# Patient Record
Sex: MALE | NOT HISPANIC OR LATINO | Employment: FULL TIME | URBAN - METROPOLITAN AREA
[De-identification: names, ages, dates, MRNs, and addresses within clinical notes are randomized per-mention and may not be internally consistent; named-entity substitution may affect disease eponyms.]

---

## 2023-11-22 ENCOUNTER — APPOINTMENT (OUTPATIENT)
Dept: PRIMARY CARE | Facility: CLINIC | Age: 43
End: 2023-11-22
Payer: COMMERCIAL

## 2023-12-14 ENCOUNTER — APPOINTMENT (OUTPATIENT)
Dept: RADIOLOGY | Facility: HOSPITAL | Age: 43
End: 2023-12-14
Payer: COMMERCIAL

## 2023-12-14 ENCOUNTER — HOSPITAL ENCOUNTER (EMERGENCY)
Facility: HOSPITAL | Age: 43
Discharge: HOME | End: 2023-12-14
Payer: COMMERCIAL

## 2023-12-14 VITALS
HEIGHT: 71 IN | OXYGEN SATURATION: 99 % | SYSTOLIC BLOOD PRESSURE: 127 MMHG | DIASTOLIC BLOOD PRESSURE: 88 MMHG | WEIGHT: 220 LBS | RESPIRATION RATE: 18 BRPM | HEART RATE: 88 BPM | BODY MASS INDEX: 30.8 KG/M2 | TEMPERATURE: 97.2 F

## 2023-12-14 DIAGNOSIS — S86.812A STRAIN OF CALF MUSCLE, LEFT, INITIAL ENCOUNTER: ICD-10-CM

## 2023-12-14 DIAGNOSIS — S86.112A GASTROCNEMIUS TEAR, LEFT, INITIAL ENCOUNTER: Primary | ICD-10-CM

## 2023-12-14 PROBLEM — S86.819A STRAIN OF CALF MUSCLE: Status: ACTIVE | Noted: 2023-12-14

## 2023-12-14 PROCEDURE — 73590 X-RAY EXAM OF LOWER LEG: CPT | Mod: LT

## 2023-12-14 PROCEDURE — 93971 EXTREMITY STUDY: CPT

## 2023-12-14 PROCEDURE — 99283 EMERGENCY DEPT VISIT LOW MDM: CPT

## 2023-12-14 PROCEDURE — 99284 EMERGENCY DEPT VISIT MOD MDM: CPT | Mod: 25

## 2023-12-14 RX ORDER — ACETAMINOPHEN 325 MG/1
650 TABLET ORAL ONCE
Status: COMPLETED | OUTPATIENT
Start: 2023-12-14 | End: 2023-12-14

## 2023-12-14 RX ADMIN — ACETAMINOPHEN 650 MG: 325 TABLET ORAL at 18:59

## 2023-12-14 ASSESSMENT — PAIN - FUNCTIONAL ASSESSMENT
PAIN_FUNCTIONAL_ASSESSMENT: 0-10

## 2023-12-14 ASSESSMENT — LIFESTYLE VARIABLES
REASON UNABLE TO ASSESS: NO
EVER HAD A DRINK FIRST THING IN THE MORNING TO STEADY YOUR NERVES TO GET RID OF A HANGOVER: NO
HAVE PEOPLE ANNOYED YOU BY CRITICIZING YOUR DRINKING: NO
EVER FELT BAD OR GUILTY ABOUT YOUR DRINKING: NO
HAVE YOU EVER FELT YOU SHOULD CUT DOWN ON YOUR DRINKING: NO

## 2023-12-14 ASSESSMENT — PAIN DESCRIPTION - ORIENTATION: ORIENTATION: LEFT

## 2023-12-14 ASSESSMENT — COLUMBIA-SUICIDE SEVERITY RATING SCALE - C-SSRS
1. IN THE PAST MONTH, HAVE YOU WISHED YOU WERE DEAD OR WISHED YOU COULD GO TO SLEEP AND NOT WAKE UP?: NO
2. HAVE YOU ACTUALLY HAD ANY THOUGHTS OF KILLING YOURSELF?: NO
6. HAVE YOU EVER DONE ANYTHING, STARTED TO DO ANYTHING, OR PREPARED TO DO ANYTHING TO END YOUR LIFE?: NO

## 2023-12-14 ASSESSMENT — PAIN SCALES - GENERAL
PAINLEVEL_OUTOF10: 6
PAINLEVEL_OUTOF10: 3
PAINLEVEL_OUTOF10: 7

## 2023-12-14 ASSESSMENT — PAIN DESCRIPTION - LOCATION
LOCATION: LEG
LOCATION: LEG

## 2023-12-14 ASSESSMENT — PAIN DESCRIPTION - PAIN TYPE: TYPE: ACUTE PAIN

## 2023-12-14 NOTE — Clinical Note
Manolo Laughlin was seen and treated in our emergency department on 12/14/2023.  He may return to work on 12/17/2023.  1-3 days if needed . Use crutches as needed . Until follow up with orthopedics      If you have any questions or concerns, please don't hesitate to call.      La Whelan, APRN-CNP

## 2023-12-14 NOTE — ED PROVIDER NOTES
Department of Emergency Medicine   ED  Provider Note  Admit Date/RoomTime: 12/14/2023  6:35 PM  ED Room: ST24/ST24        History of Present Illness:  Chief Complaint   Patient presents with    Leg Pain     Pt states he was walking and felt a pop in left calf. C/o left calf pain increasing with walking.          Manolo Laughlin is a 43 y.o. male denies chronic medical illnesses presenting to the ED for left calf pain, beginning suddenly around 4 PM.  Patient states that he forgot something and went to turn around and run back to his car when he felt a pop in his calf and had sudden onset of pain now is having difficulty walking.  He is unaware of any bruising or swelling no change in temperature color sensation of the leg.  Reports it hurts to move his foot up and down in the calf area.  History of a sprained ankle in the past while running cross-country and school several years ago.  No fracture no other injury no history of blood clots.  He denies chest pain or shortness of breath no abdominal pain nausea vomiting no fever or chills.    Review of Systems:   Pertinent positives and negatives are stated within HPI, all other systems reviewed and are negative.        --------------------------------------------- PAST HISTORY ---------------------------------------------  Past Medical History:  has no past medical history on file.  Past Surgical History:  has a past surgical history that includes Other surgical history (06/26/2020); Other surgical history (06/26/2020); and Other surgical history (06/26/2020).  Social History:    Family History: family history is not on file.. Unless otherwise noted, family history is non contributory  The patient’s home medications have been reviewed.  Allergies: Penicillins and Yeast, dried        ---------------------------------------------------PHYSICAL EXAM--------------------------------------    GENERAL APPEARANCE: Awake and alert.   VITAL SIGNS: As per the nurses' triage  "record.   HEENT: Normocephalic, atraumatic. Extraocular muscles are intact.   CHEST: Nontender to palpation. Clear to auscultation bilaterally. No rales, rhonchi, or wheezing.   HEART: S1, S2. Regular rate and rhythm. No murmurs, gallops or rubs.  Strong and equal pulses in the extremities.   ABDOMEN: Soft, nontender, nondistended, positive bowel sounds, no palpable masses.  MUSCULCSKELETAL: Moves upper extremities with no difficulty.  Left lower extremity quadricep intact.  No pain palpation manipulation of the knee.  No bruising or edema noted.  Tenderness to the mid calf no redness or warmth no bruising negative Agarwal test.  No pain palpation manipulation of the ankle or foot.  Pedal pulse intact neurovascularly intact.  Has pain at the mid calf with dorsiflexion of the foot.  Right lower extremity patient was with no difficulty.  NEUROLOGICAL: Awake, alert and oriented x 3. Power intact in the upper and lower extremities. Sensation is intact to light touch in the upper and lower extremities.   IMMUNOLOGICAL: No lymphatic streaking noted   DERM: No petechiae, rashes, or ecchymoses.          ------------------------- NURSING NOTES AND VITALS REVIEWED ---------------------------  The nursing notes within the ED encounter and vital signs as below have been reviewed by myself  /90 (Patient Position: Sitting)   Pulse 96   Temp 36.2 °C (97.2 °F) (Temporal)   Resp 20   Ht 1.803 m (5' 11\")   Wt 99.8 kg (220 lb)   SpO2 100%   BMI 30.68 kg/m²     Oxygen Saturation Interpretation: Normal      The patient’s available past medical records and past encounters were reviewed.          -----------------------DIAGNOSTIC RESULTS------------------------  LABS:    Labs Reviewed - No data to display    As interpreted by me, the above displayed labs are abnormal. All other labs obtained during this visit were within normal range or not returned as of this dictation.      Lower extremity venous duplex left   Final Result "   No evidence of deep vein thrombosis.        Signed by: Wilian Wooten 12/14/2023 8:39 PM   Dictation workstation:   [id number]      XR tibia fibula left 2 views   Final Result   No acute osseous abnormality of the tibia or fibula with surrounding   subcutaneous edema without fluid collection suggested.        Signed by: Wilian Wooten 12/14/2023 7:19 PM   Dictation workstation:   [id number]              Lower extremity venous duplex left   Final Result   No evidence of deep vein thrombosis.        Signed by: Wilian Wooten 12/14/2023 8:39 PM   Dictation workstation:   [id number]      XR tibia fibula left 2 views   Final Result   No acute osseous abnormality of the tibia or fibula with surrounding   subcutaneous edema without fluid collection suggested.        Signed by: Wilian Wooten 12/14/2023 7:19 PM   Dictation workstation:   [id number]              ------------------------------ ED COURSE/MEDICAL DECISION MAKING----------------------  Medical Decision Making:   Exam: A medically appropriate exam performed, outlined above, given the known history and presentation.    History obtained from: Review of medical record patient nursing notes      Social Determinants of Health considered during this visit: Lives at home      PAST MEDICAL HISTORY/Chronic Conditions Affecting Care     has no past medical history on file.       CC/HPI Summary, Social Determinants of health, Records Reviewed, DDx, testing done/not done, ED Course, Reassessment, disposition considerations/shared decision making with patient, consults, disposition:   Presents with left calf pain after feeling a pop  Ultrasound left lower extremity-No evidence of deep vein thrombosis.   X-ray tib-fib left- No acute osseous abnormality of the tibia or fibula with surrounding  subcutaneous edema without fluid collection suggested.  Tylenol    Medical Decision Making/Differential Diagnosis:  Differentials include not limited to gastroc tear versus sprain strain  versus DVT versus fracture patient is neurovascularly intact.  No signs of compartment syndrome.  No erythema to indicate cellulitis or septic joint.  Full range of motion of the knee and ankle without signs of injury  Patient is neurovascular intact.  Peripheral pulses intact.  No redness or warmth noted to the calf.  Ultrasound of the lower extremity showed no evidence of deep vein thrombosis.  X-ray of the left tib-fib showed no acute osseous abnormality of the tibia or fibula was noted to have surrounding subcutaneous edema without fluid collection suggested.  Patient was medicated with Tylenol.  Based on clinical presentation history and symptoms consistent with a left gastroc strain possible gastroc tear.  Discussed case with orthopedics on-call.  Ice rest elevate weightbearing as tolerated Ace wrap applied by nursing.  Patient was educated on crutch walking by nursing.  Remains neurovascularly intact.  Patient seen independently Dr. lewis available for consult if needed    PROCEDURES  Unless otherwise noted below, none      CONSULTS:   None  Dr. Hernandez orthopedics     ED Course as of 12/14/23 2104   Thu Dec 14, 2023   2002 Spoke with Dr. Hernandez on-call orthopedic surgeon.  Discussed case with him.  Discussed plan if ultrasound and x-ray negative weightbearing as tolerated Ace wrap ice rest and elevate and follow-up with orthopedics.  No further instructions. [TB]      ED Course User Index  [TB] aL Whelan, APRN-CNP         Diagnoses as of 12/14/23 2104   Gastrocnemius tear, left, initial encounter   Strain of calf muscle, left, initial encounter         This patient has remained hemodynamically stable during their ED course.      Critical Care: None      Counseling:  The emergency provider has spoken with the patient and discussed today’s results, in addition to providing specific details for the plan of care and counseling regarding the diagnosis and prognosis.  Questions are answered at this time  and they are agreeable with the plan.         --------------------------------- IMPRESSION AND DISPOSITION ---------------------------------    IMPRESSION  1. Gastrocnemius tear, left, initial encounter    2. Strain of calf muscle, left, initial encounter        DISPOSITION  Disposition: Discharged home   Patient condition is stable        NOTE: This report was transcribed using voice recognition software. Every effort was made to ensure accuracy; however, inadvertent computerized transcription errors may be present      La Whelan, JESS-CNP  12/14/23 [id number]

## 2023-12-15 NOTE — DISCHARGE INSTRUCTIONS
Ice 20 minutes at a time 4 times a day and after activity  Tylenol Motrin for pain do not go over the recommended daily dosage.  Weightbearing as tolerated Ace wrap with ambulation    Follow-up with orthopedic surgeon within 1 week  Follow-up with primary care physician in 2 days for reevaluation  Return to the emergency department any worsening symptoms or concerns  Today it was noted that your blood pressure was elevated follow-up with primary care physician for reevaluation.  Check your blood pressure daily and document the results for your primary care physician.  Return to the emerged part with any worsening symptoms or concerns

## 2024-04-15 ENCOUNTER — OFFICE VISIT (OUTPATIENT)
Dept: PRIMARY CARE | Facility: CLINIC | Age: 44
End: 2024-04-15
Payer: COMMERCIAL

## 2024-04-15 VITALS
HEART RATE: 80 BPM | OXYGEN SATURATION: 98 % | BODY MASS INDEX: 33.5 KG/M2 | DIASTOLIC BLOOD PRESSURE: 94 MMHG | WEIGHT: 240.2 LBS | SYSTOLIC BLOOD PRESSURE: 170 MMHG

## 2024-04-15 DIAGNOSIS — R03.0 ELEVATED BLOOD PRESSURE READING: Primary | ICD-10-CM

## 2024-04-15 PROCEDURE — 99213 OFFICE O/P EST LOW 20 MIN: CPT | Performed by: PHYSICIAN ASSISTANT

## 2024-04-15 PROCEDURE — 1036F TOBACCO NON-USER: CPT | Performed by: PHYSICIAN ASSISTANT

## 2024-04-15 RX ORDER — L.ACIDOPH/B.ANIMALIS/B.LONGUM 15B CELL
1 CAPSULE ORAL DAILY
COMMUNITY
End: 2024-04-15 | Stop reason: WASHOUT

## 2024-04-15 RX ORDER — GABAPENTIN 100 MG/1
100 CAPSULE ORAL 2 TIMES DAILY
COMMUNITY
Start: 2020-06-26 | End: 2024-04-15 | Stop reason: WASHOUT

## 2024-04-15 RX ORDER — CYCLOBENZAPRINE HCL 5 MG
5 TABLET ORAL EVERY 8 HOURS PRN
COMMUNITY
Start: 2022-12-28 | End: 2024-04-15 | Stop reason: WASHOUT

## 2024-04-15 RX ORDER — FAMOTIDINE 10 MG/1
TABLET ORAL
COMMUNITY

## 2024-04-15 RX ORDER — PSYLLIUM HUSK 3.4 G/5.8G
1 POWDER ORAL DAILY
COMMUNITY
End: 2024-04-15 | Stop reason: WASHOUT

## 2024-04-15 ASSESSMENT — PATIENT HEALTH QUESTIONNAIRE - PHQ9
SUM OF ALL RESPONSES TO PHQ9 QUESTIONS 1 AND 2: 0
2. FEELING DOWN, DEPRESSED OR HOPELESS: NOT AT ALL
1. LITTLE INTEREST OR PLEASURE IN DOING THINGS: NOT AT ALL

## 2024-04-15 ASSESSMENT — PAIN SCALES - GENERAL: PAINLEVEL: 6

## 2024-04-15 ASSESSMENT — ENCOUNTER SYMPTOMS
MYALGIAS: 0
DIZZINESS: 0
VOMITING: 0
COUGH: 0
FEVER: 0
NAUSEA: 0
FATIGUE: 0
SHORTNESS OF BREATH: 0

## 2024-04-15 ASSESSMENT — COLUMBIA-SUICIDE SEVERITY RATING SCALE - C-SSRS
6. HAVE YOU EVER DONE ANYTHING, STARTED TO DO ANYTHING, OR PREPARED TO DO ANYTHING TO END YOUR LIFE?: NO
2. HAVE YOU ACTUALLY HAD ANY THOUGHTS OF KILLING YOURSELF?: NO
1. IN THE PAST MONTH, HAVE YOU WISHED YOU WERE DEAD OR WISHED YOU COULD GO TO SLEEP AND NOT WAKE UP?: NO

## 2024-04-15 NOTE — ASSESSMENT & PLAN NOTE
Pt encouraged to purchase home BP cuff and monitor readings BID x 1 week. Call w/results and will determine need for medication. Discussed lifestyle changes - exercise, low salt diet, cutting back on vaping.

## 2024-04-15 NOTE — LETTER
April 15, 2024     Patient: Manolo Laughlin   YOB: 1980   Date of Visit: 4/15/2024       To Whom It May Concern:    Manolo Laughlin was seen in my clinic on 4/15/2024 at 11:10 am. Please excuse Manolo for his absence from work on this day to make the appointment.    If you have any questions or concerns, please don't hesitate to call.         Sincerely,         Eusebia Tello PA-C

## 2024-04-15 NOTE — PROGRESS NOTES
Subjective   Patient ID: Manolo Laughlin is a 44 y.o. male who presents for Blood Pressure Check (Pt is here to discuss his bp and heart palpations / nr ).    HPI   Pt here to discuss elevated BP. States few mos ago, had an episode of palpitations that woke him from his sleep. Has not happened since. Had recent dentist visit w/elevated BP. Was told he has obvious teeth grinding and should be worked up for sleep apnea. He is unsure if his insurance will cover this. He does admit to snoring, no witnessed apnea or excessive daytime sleepiness.    He uses a nicotine vape. Has +FamHx of heart disease (maternal + paternal grandparents, mom on coumadin but pt unsure why). He works an active job ([employer]), does consume a lot of salt in his diet. Working on stress management.        Review of Systems   Constitutional:  Negative for fatigue and fever.   Respiratory:  Negative for cough and shortness of breath.    Cardiovascular:  Negative for chest pain.   Gastrointestinal:  Negative for nausea and vomiting.   Musculoskeletal:  Negative for myalgias.   Skin:  Negative for rash.   Neurological:  Negative for dizziness.       Objective   BP (!) 170/94   Pulse 80   Wt 109 kg (240 lb 3.2 oz)   SpO2 98%   BMI 33.50 kg/m²     Physical Exam  Constitutional:       Appearance: Normal appearance.   HENT:      Head: Normocephalic and atraumatic.   Eyes:      Extraocular Movements: Extraocular movements intact.      Conjunctiva/sclera: Conjunctivae normal.   Cardiovascular:      Rate and Rhythm: Normal rate and regular rhythm.   Pulmonary:      Effort: Pulmonary effort is normal.      Breath sounds: Normal breath sounds. No wheezing or rhonchi.   Musculoskeletal:      Cervical back: Normal range of motion and neck supple.   Skin:     General: Skin is warm and dry.   Neurological:      General: No focal deficit present.      Mental Status: He is alert.   Psychiatric:         Mood and Affect: Mood normal.         Assessment/Plan    Problem List Items Addressed This Visit             ICD-10-CM    Elevated blood pressure reading - Primary R03.0     Pt encouraged to purchase home BP cuff and monitor readings BID x 1 week. Call w/results and will determine need for medication. Discussed lifestyle changes - exercise, low salt diet, cutting back on vaping.          He will call insurance to see if they will cover home sleep study. FU 1 week w/readings, 3 weeks in office for BP check.

## 2024-04-25 ENCOUNTER — TELEPHONE (OUTPATIENT)
Dept: PRIMARY CARE | Facility: CLINIC | Age: 44
End: 2024-04-25
Payer: COMMERCIAL

## 2024-04-25 DIAGNOSIS — R03.0 ELEVATED BLOOD PRESSURE READING: Primary | ICD-10-CM

## 2024-04-25 NOTE — TELEPHONE ENCOUNTER
Pt called with BP readings. Pt state that if they are too high, he is okay a BP medication, as he is not sure what else to do. Pt state that he has changed his diet to stop with salt, taking vitamins and water more. Pt states that he rather not take any BP medications, but will start one if he has to. Pt last seen on 4/15/2024.      BP                   P  159/102           86  144/92             82  151/87             80  149/105           90  165/101           87  144/88             83  120/84            90  144/91             81  132/75             96  154/98             80

## 2024-04-26 RX ORDER — LOSARTAN POTASSIUM 25 MG/1
25 TABLET ORAL DAILY
Qty: 30 TABLET | Refills: 1 | Status: SHIPPED | OUTPATIENT
Start: 2024-04-26 | End: 2024-06-25

## 2024-04-26 NOTE — TELEPHONE ENCOUNTER
Called and spoke with pt, who verbally understands. Pt has scheduled an appt with Sonja on May 31st.

## 2024-04-26 NOTE — TELEPHONE ENCOUNTER
Yes, BP is too high. I agree, I don't like to start meds when unnecessary, but the risk of leaving it untreated is higher. I will start him on 25mg losartan. Side effects - can cause angioedema (lip/tongue swelling), elevated potassium. Most people tolerate this med really well w/no side effects. Schedule him a 1mo FU and we'll check his BP + labs at that time.

## 2024-05-10 ENCOUNTER — APPOINTMENT (OUTPATIENT)
Dept: PRIMARY CARE | Facility: CLINIC | Age: 44
End: 2024-05-10
Payer: COMMERCIAL

## 2024-05-31 ENCOUNTER — APPOINTMENT (OUTPATIENT)
Dept: PRIMARY CARE | Facility: CLINIC | Age: 44
End: 2024-05-31
Payer: COMMERCIAL

## 2024-06-10 ENCOUNTER — OFFICE VISIT (OUTPATIENT)
Dept: PRIMARY CARE | Facility: CLINIC | Age: 44
End: 2024-06-10
Payer: COMMERCIAL

## 2024-06-10 VITALS
HEART RATE: 92 BPM | WEIGHT: 239.6 LBS | BODY MASS INDEX: 33.42 KG/M2 | DIASTOLIC BLOOD PRESSURE: 72 MMHG | OXYGEN SATURATION: 98 % | SYSTOLIC BLOOD PRESSURE: 130 MMHG

## 2024-06-10 DIAGNOSIS — F41.9 ANXIETY AND DEPRESSION: Primary | ICD-10-CM

## 2024-06-10 DIAGNOSIS — F32.A ANXIETY AND DEPRESSION: Primary | ICD-10-CM

## 2024-06-10 DIAGNOSIS — I10 ESSENTIAL HYPERTENSION: ICD-10-CM

## 2024-06-10 PROCEDURE — 1036F TOBACCO NON-USER: CPT | Performed by: PHYSICIAN ASSISTANT

## 2024-06-10 PROCEDURE — 99214 OFFICE O/P EST MOD 30 MIN: CPT | Performed by: PHYSICIAN ASSISTANT

## 2024-06-10 PROCEDURE — 3078F DIAST BP <80 MM HG: CPT | Performed by: PHYSICIAN ASSISTANT

## 2024-06-10 PROCEDURE — 3075F SYST BP GE 130 - 139MM HG: CPT | Performed by: PHYSICIAN ASSISTANT

## 2024-06-10 RX ORDER — SERTRALINE HYDROCHLORIDE 50 MG/1
TABLET, FILM COATED ORAL
Qty: 30 TABLET | Refills: 1 | Status: SHIPPED | OUTPATIENT
Start: 2024-06-10

## 2024-06-10 RX ORDER — MULTIVIT WITH IRON,MINERALS
TABLET ORAL
COMMUNITY

## 2024-06-10 RX ORDER — CALCIUM CARB/VITAMIN D3/VIT K1 500-500-40
TABLET,CHEWABLE ORAL DAILY
COMMUNITY

## 2024-06-10 RX ORDER — HYDROXYZINE HYDROCHLORIDE 25 MG/1
25 TABLET, FILM COATED ORAL NIGHTLY
Qty: 30 TABLET | Refills: 3 | Status: SHIPPED | OUTPATIENT
Start: 2024-06-10 | End: 2024-10-08

## 2024-06-10 ASSESSMENT — COLUMBIA-SUICIDE SEVERITY RATING SCALE - C-SSRS
1. IN THE PAST MONTH, HAVE YOU WISHED YOU WERE DEAD OR WISHED YOU COULD GO TO SLEEP AND NOT WAKE UP?: NO
6. HAVE YOU EVER DONE ANYTHING, STARTED TO DO ANYTHING, OR PREPARED TO DO ANYTHING TO END YOUR LIFE?: NO
2. HAVE YOU ACTUALLY HAD ANY THOUGHTS OF KILLING YOURSELF?: NO

## 2024-06-10 ASSESSMENT — ENCOUNTER SYMPTOMS
NERVOUS/ANXIOUS: 1
DIZZINESS: 0
FATIGUE: 0
COUGH: 0
FEVER: 0
SHORTNESS OF BREATH: 0
DYSPHORIC MOOD: 1

## 2024-06-10 ASSESSMENT — PATIENT HEALTH QUESTIONNAIRE - PHQ9
SUM OF ALL RESPONSES TO PHQ9 QUESTIONS 1 AND 2: 0
1. LITTLE INTEREST OR PLEASURE IN DOING THINGS: NOT AT ALL
2. FEELING DOWN, DEPRESSED OR HOPELESS: NOT AT ALL

## 2024-06-10 ASSESSMENT — PAIN SCALES - GENERAL: PAINLEVEL: 0-NO PAIN

## 2024-06-10 NOTE — PROGRESS NOTES
Subjective   Patient ID: Manolo Laughlin is a 44 y.o. male who presents for Blood Pressure Check (Pt is here for bp check, pt states that his BP this morning was 120/70/ nr).    HPI   HTN - controlled on losartan. New med, started 4/15. BP significantly improved.     Anxiety - worsening. Was up for promotion for the 4th time and was denied. He has been feeling overwhelmed w/intrusive recurrent thoughts. Difficulty sleeping. Reports feeling depressed due to being single. He has a hx of EtOH use disorder and it was suggested that he start latuda while he was in recovery, but he did not. Has never been on any other psych meds, but would like to try something at this point.      Review of Systems   Constitutional:  Negative for fatigue and fever.   Respiratory:  Negative for cough and shortness of breath.    Cardiovascular:  Negative for chest pain.   Neurological:  Negative for dizziness.   Psychiatric/Behavioral:  Positive for dysphoric mood. The patient is nervous/anxious.        Objective   /72   Pulse 92   Wt 109 kg (239 lb 9.6 oz)   SpO2 98%   BMI 33.42 kg/m²     Physical Exam  Constitutional:       Appearance: Normal appearance.   HENT:      Head: Normocephalic and atraumatic.   Eyes:      Extraocular Movements: Extraocular movements intact.      Conjunctiva/sclera: Conjunctivae normal.   Cardiovascular:      Rate and Rhythm: Normal rate and regular rhythm.   Pulmonary:      Effort: Pulmonary effort is normal.      Breath sounds: Normal breath sounds. No wheezing or rhonchi.   Musculoskeletal:      Cervical back: Normal range of motion and neck supple.   Skin:     General: Skin is warm and dry.   Neurological:      General: No focal deficit present.      Mental Status: He is alert.   Psychiatric:         Mood and Affect: Mood normal.         Assessment/Plan   Problem List Items Addressed This Visit    None  Visit Diagnoses         Codes    Anxiety and depression    -  Primary F41.9, F32.A    Relevant  Medications    hydrOXYzine HCL (Atarax) 25 mg tablet    sertraline (Zoloft) 50 mg tablet    Essential hypertension     I10    Relevant Orders    Basic Metabolic Panel          FU 2 mos for HTN + anxiety. Call sooner w/any questions or concerns.

## 2024-06-19 DIAGNOSIS — R03.0 ELEVATED BLOOD PRESSURE READING: ICD-10-CM

## 2024-06-19 RX ORDER — LOSARTAN POTASSIUM 25 MG/1
25 TABLET ORAL DAILY
Qty: 90 TABLET | Refills: 1 | Status: SHIPPED | OUTPATIENT
Start: 2024-06-19

## 2024-06-24 ENCOUNTER — LAB (OUTPATIENT)
Dept: LAB | Facility: LAB | Age: 44
End: 2024-06-24
Payer: COMMERCIAL

## 2024-06-24 DIAGNOSIS — I10 ESSENTIAL HYPERTENSION: ICD-10-CM

## 2024-06-24 LAB
ANION GAP SERPL CALC-SCNC: 15 MMOL/L (ref 10–20)
BUN SERPL-MCNC: 12 MG/DL (ref 6–23)
CALCIUM SERPL-MCNC: 10 MG/DL (ref 8.6–10.6)
CHLORIDE SERPL-SCNC: 102 MMOL/L (ref 98–107)
CO2 SERPL-SCNC: 29 MMOL/L (ref 21–32)
CREAT SERPL-MCNC: 1.03 MG/DL (ref 0.5–1.3)
EGFRCR SERPLBLD CKD-EPI 2021: >90 ML/MIN/1.73M*2
GLUCOSE SERPL-MCNC: 99 MG/DL (ref 74–99)
POTASSIUM SERPL-SCNC: 3.8 MMOL/L (ref 3.5–5.3)
SODIUM SERPL-SCNC: 142 MMOL/L (ref 136–145)

## 2024-06-24 PROCEDURE — 80048 BASIC METABOLIC PNL TOTAL CA: CPT

## 2024-06-24 PROCEDURE — 36415 COLL VENOUS BLD VENIPUNCTURE: CPT

## 2024-08-02 ENCOUNTER — OFFICE VISIT (OUTPATIENT)
Dept: PRIMARY CARE | Facility: CLINIC | Age: 44
End: 2024-08-02
Payer: COMMERCIAL

## 2024-08-02 ENCOUNTER — TELEPHONE (OUTPATIENT)
Dept: PRIMARY CARE | Facility: CLINIC | Age: 44
End: 2024-08-02

## 2024-08-02 ENCOUNTER — LAB (OUTPATIENT)
Dept: LAB | Facility: LAB | Age: 44
End: 2024-08-02
Payer: COMMERCIAL

## 2024-08-02 VITALS
HEART RATE: 93 BPM | SYSTOLIC BLOOD PRESSURE: 134 MMHG | DIASTOLIC BLOOD PRESSURE: 72 MMHG | OXYGEN SATURATION: 98 % | BODY MASS INDEX: 33.39 KG/M2 | WEIGHT: 239.4 LBS

## 2024-08-02 DIAGNOSIS — F32.A ANXIETY AND DEPRESSION: ICD-10-CM

## 2024-08-02 DIAGNOSIS — R45.86 MOOD SWING: Primary | ICD-10-CM

## 2024-08-02 DIAGNOSIS — F41.9 ANXIETY AND DEPRESSION: ICD-10-CM

## 2024-08-02 DIAGNOSIS — R68.89 HEAT INTOLERANCE: ICD-10-CM

## 2024-08-02 DIAGNOSIS — R68.89 HEAT INTOLERANCE: Primary | ICD-10-CM

## 2024-08-02 PROCEDURE — 99213 OFFICE O/P EST LOW 20 MIN: CPT | Performed by: PHYSICIAN ASSISTANT

## 2024-08-02 PROCEDURE — 36415 COLL VENOUS BLD VENIPUNCTURE: CPT

## 2024-08-02 PROCEDURE — 1036F TOBACCO NON-USER: CPT | Performed by: PHYSICIAN ASSISTANT

## 2024-08-02 PROCEDURE — 84443 ASSAY THYROID STIM HORMONE: CPT

## 2024-08-02 RX ORDER — SERTRALINE HYDROCHLORIDE 50 MG/1
75 TABLET, FILM COATED ORAL DAILY
Qty: 45 TABLET | Refills: 0 | Status: SHIPPED | OUTPATIENT
Start: 2024-08-02

## 2024-08-02 ASSESSMENT — ENCOUNTER SYMPTOMS
ABDOMINAL PAIN: 0
NERVOUS/ANXIOUS: 0
DYSPHORIC MOOD: 0
CONSTIPATION: 0
DIARRHEA: 0

## 2024-08-02 ASSESSMENT — PATIENT HEALTH QUESTIONNAIRE - PHQ9
1. LITTLE INTEREST OR PLEASURE IN DOING THINGS: NOT AT ALL
2. FEELING DOWN, DEPRESSED OR HOPELESS: NOT AT ALL
SUM OF ALL RESPONSES TO PHQ9 QUESTIONS 1 AND 2: 0

## 2024-08-02 ASSESSMENT — PAIN SCALES - GENERAL: PAINLEVEL: 0-NO PAIN

## 2024-08-02 NOTE — TELEPHONE ENCOUNTER
Patient called requesting labs to check his hormone levels be added to his already existing orders.  --  Last seen 08/02/24

## 2024-08-02 NOTE — PROGRESS NOTES
Subjective   Patient ID: Manolo Laughlin is a 44 y.o. male who presents for Blood Pressure Check (Pt is here for BP check / nr) and Excessive Sweating (Pt would like to discuss about his sweating issues / nr).    HPI   HTN - controlled on losartan. Tolerating well. Watches sodium intake.     Anxiety - last visit c/o worsening anxiety. Was up for promotion for the 4th time and was denied. He has been feeling overwhelmed w/intrusive recurrent thoughts. Difficulty sleeping. Reports feeling depressed due to being single. He has a hx of EtOH use disorder and it was suggested that he start latuda while he was in recovery, but he did not. Has never been on any other psych meds, but would like to try something at this point.    Started on zoloft + hydroxyzine PRN (mostly for sleep). He does note significant improvement in anxiety, not as stressed over things that he used to be. Thinks he may benefit from slightly increased dose. Uses hydroxyzine for sleep which is helping.    Review of Systems   Gastrointestinal:  Negative for abdominal pain, constipation and diarrhea.   Endocrine: Positive for heat intolerance.   Psychiatric/Behavioral:  Negative for dysphoric mood. The patient is not nervous/anxious.        Objective   /72   Pulse 93   Wt 109 kg (239 lb 6.4 oz)   SpO2 98%   BMI 33.39 kg/m²     Physical Exam  Constitutional:       Appearance: Normal appearance.   HENT:      Head: Normocephalic and atraumatic.   Eyes:      Extraocular Movements: Extraocular movements intact.      Conjunctiva/sclera: Conjunctivae normal.   Cardiovascular:      Rate and Rhythm: Normal rate and regular rhythm.   Pulmonary:      Effort: Pulmonary effort is normal.      Breath sounds: Normal breath sounds. No wheezing or rhonchi.   Musculoskeletal:      Cervical back: Normal range of motion and neck supple.   Skin:     General: Skin is warm and dry.   Neurological:      General: No focal deficit present.      Mental Status: He is  alert.   Psychiatric:         Mood and Affect: Mood normal.         Assessment/Plan   Problem List Items Addressed This Visit    None  Visit Diagnoses         Codes    Heat intolerance    -  Primary R68.89    Relevant Orders    TSH with reflex to Free T4 if abnormal    Anxiety and depression     F41.9, F32.A    Relevant Medications    sertraline (Zoloft) 50 mg tablet

## 2024-08-03 LAB — TSH SERPL-ACNC: 1.08 MIU/L (ref 0.44–3.98)

## 2024-08-05 ENCOUNTER — LAB (OUTPATIENT)
Dept: LAB | Facility: LAB | Age: 44
End: 2024-08-05
Payer: COMMERCIAL

## 2024-08-05 DIAGNOSIS — R45.86 MOOD SWING: ICD-10-CM

## 2024-08-05 LAB — TESTOST SERPL-MCNC: 329 NG/DL (ref 240–1000)

## 2024-08-05 PROCEDURE — 36415 COLL VENOUS BLD VENIPUNCTURE: CPT

## 2024-08-05 PROCEDURE — 84403 ASSAY OF TOTAL TESTOSTERONE: CPT

## 2024-08-05 NOTE — PROGRESS NOTES
History Of Present Illness  Manolo Laughlin is a 44 y.o. male presenting with rectal bleeding. Patient has a history of anal fissures that were treated by Dr. Reese Clemnes, Dr. Bryant and Dr. Saavedra. 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 His last colonoscopy was in 2021 with Dr. Bryant for hemorrhoids that he had banded at the time and was told to repeat in 10 years.     Occasional burning itching bleeding symptoms.   BM can be painful, irregular, usually in AM  On a fiber supplement but taking 1 capsule once daily which is 500 mg    Anal manometry: 01/10/2022   Reason for testing: constipation     Anorectal Manometry Testing:   Strength:   Anorectal manometry was performed.      Average Pressure  Interpretation         Rest: 115 mmHg  This is well above normal range. Normal range is 35-50 mmHg.      Squeeze: 209 mmHg  This is well above normal range. Normal range is 75 - 100 mmHg.        Resting and squeeze pressures well above normal may indicate high pelvic floor tension.     There is appropriate incremental change between resting and squeeze pressures which can indicate good pelvic floor movement with squeeze.     Sensory:     Sensation    Volume    First sensation :  37 mL / Normal Range: 40-80 mL   First urge to defecate:   70 mL / Normal Range:  mL   Maximum tolerable volume:  100 mL / Normal Range: 120-180 mL   Recto-anal inhibitory reflex:  Yes   Balloon expulsion:   No     This exhibit hyperacute rectal sensation with at least 2/3 sensory tests.      A recto-anal inhibitory reflex (RAIR) was present. This is a normal reflex.     EMG Recruitment:   EMG recruitment was performed.  The patient shows a normal increase in activity with squeeze, and a paradoxic increase in activity with valsalva.  This indicates abnormal  pelvic floor movement, which can be indicative of poor pelvic floor coordination secondary to pelvic floor non-relaxation / dyssynergia.     11/2019 exam under  anesthesia with banding  1/2021 3 column Hemorrhoidectomy  3/2021 THD for bleeding hemorrhoids  8/2021 HRA for anal ulcers  Final Diagnosis    1. PERIANAL ULCER, LEFT ANTERIOR QUADRANT, BIOPSY:  SKIN/ SQUAMOUS MUCOSA WITH MILD DERMAL CHRONIC INFLAMMATION,  SEE COMMENT.      12/8/2022 Dr. Reese Clemens  Posterior midline and left posterior quadrant superficial anal fistula, fistulotomy performed  Intersphincteric Botox injection on either side of posterior midline and anterior midline.    Pathology: 12/30/2022  A. Fistula, excision:  - Squamous mucosa with reactive epithelial changes and underlying acute and chronic inflammation, compatible with fistula tract.           Colonoscopy: 2/25/2021 Dr. Bryant   Impression:       - Internal hemorrhoids. Banded.       - Two 2 to 4 mm polyps in the rectum, removed with a cold biopsy        forceps. Resected and retrieved.       - Localized mild inflammation was found in the rectum secondary to        proctitis. Biopsied.       - The examination was otherwise normal.  Final Diagnosis    RECTAL POLYPS, COLD BIOPSY:  HYPERPLASTIC POLYPS    Past Medical History  He has no past medical history on file.    Immunization History   Administered Date(s) Administered    Moderna SARS-CoV-2 Vaccination 04/17/2021, 05/14/2021    Pfizer Purple Cap SARS-CoV-2 12/09/2021     Surgical History  He has a past surgical history that includes Other surgical history (06/26/2020); Other surgical history (06/26/2020); and Other surgical history (06/26/2020).     Social History  He reports that he has never smoked. He has never used smokeless tobacco. No history on file for alcohol use and drug use.    Family History  His family history is not on file.     Allergies  Penicillins and Yeast, dried      Review of Systems   Constitutional: Negative.    HENT: Negative.     Eyes: Negative.    Respiratory: Negative.     Cardiovascular: Negative.    Gastrointestinal: Negative.    Genitourinary: Negative.     Skin: Negative.    All other systems reviewed and are negative.       Physical Exam  Constitutional:       Appearance: Normal appearance.   HENT:      Head: Normocephalic.   Eyes:      Pupils: Pupils are equal, round, and reactive to light.   Cardiovascular:      Rate and Rhythm: Normal rate.   Pulmonary:      Effort: Pulmonary effort is normal.   Abdominal:      General: Abdomen is flat. Bowel sounds are normal.      Palpations: Abdomen is soft.   Genitourinary:     Comments: Verbal consent was obtained and with chaperone present the patient was placed in prone Kraske position. Perianal skin was examined mild perianal excoriation but no recurrent fistula or fissure..  No external hemorrhoids identified.  Digital rectal exam revealed normal tone with good squeeze.  No palpable masses appreciated.  Anoscope was inserted with grade 1 internal hemorrhoids without sequela of recent bleeding.  Skin:     General: Skin is warm.   Neurological:      General: No focal deficit present.      Mental Status: He is alert.          Vitals  BP: ()/()   Arterial Line BP 1: ()/()         Relevant Results             Assessment/Plan   Problem List Items Addressed This Visit             ICD-10-CM       Gastrointestinal and Abdominal    Pruritus ani L29.0    Bright red blood per rectum - Primary K62.5     Last colonoscopy within 3 years  Grade 1 internal hemorrhoids without obvious bleeding  Symptoms likely from pruritus ani  Discussed increasing fiber to a total of 25 g daily for intermittent and infrequent bleeding  Barrier creams for perianal symptoms, should these not suffice will trial antifungal no obvious fungal infection seen today.

## 2024-08-08 ENCOUNTER — OFFICE VISIT (OUTPATIENT)
Dept: SURGERY | Facility: CLINIC | Age: 44
End: 2024-08-08
Payer: COMMERCIAL

## 2024-08-08 VITALS
TEMPERATURE: 98.2 F | HEART RATE: 94 BPM | HEIGHT: 71 IN | DIASTOLIC BLOOD PRESSURE: 82 MMHG | OXYGEN SATURATION: 96 % | SYSTOLIC BLOOD PRESSURE: 174 MMHG | WEIGHT: 238 LBS | BODY MASS INDEX: 33.32 KG/M2

## 2024-08-08 DIAGNOSIS — K62.5 BRIGHT RED BLOOD PER RECTUM: Primary | ICD-10-CM

## 2024-08-08 DIAGNOSIS — L29.0 PRURITUS ANI: ICD-10-CM

## 2024-08-08 PROCEDURE — 99213 OFFICE O/P EST LOW 20 MIN: CPT | Performed by: SURGERY

## 2024-08-08 PROCEDURE — 1036F TOBACCO NON-USER: CPT | Performed by: SURGERY

## 2024-08-08 PROCEDURE — 3008F BODY MASS INDEX DOCD: CPT | Performed by: SURGERY

## 2024-08-08 PROCEDURE — 46600 DIAGNOSTIC ANOSCOPY SPX: CPT | Performed by: SURGERY

## 2024-08-08 RX ORDER — MULTIVITAMIN
0.4 TABLET ORAL EVERY 24 HOURS
COMMUNITY

## 2024-08-08 ASSESSMENT — ENCOUNTER SYMPTOMS
OCCASIONAL FEELINGS OF UNSTEADINESS: 0
EYES NEGATIVE: 1
CONSTITUTIONAL NEGATIVE: 1
DEPRESSION: 0
LOSS OF SENSATION IN FEET: 0
GASTROINTESTINAL NEGATIVE: 1
CARDIOVASCULAR NEGATIVE: 1
RESPIRATORY NEGATIVE: 1

## 2024-08-08 ASSESSMENT — PATIENT HEALTH QUESTIONNAIRE - PHQ9
1. LITTLE INTEREST OR PLEASURE IN DOING THINGS: NOT AT ALL
SUM OF ALL RESPONSES TO PHQ9 QUESTIONS 1 & 2: 0
2. FEELING DOWN, DEPRESSED OR HOPELESS: NOT AT ALL

## 2024-08-08 ASSESSMENT — COLUMBIA-SUICIDE SEVERITY RATING SCALE - C-SSRS
2. HAVE YOU ACTUALLY HAD ANY THOUGHTS OF KILLING YOURSELF?: NO
6. HAVE YOU EVER DONE ANYTHING, STARTED TO DO ANYTHING, OR PREPARED TO DO ANYTHING TO END YOUR LIFE?: NO
1. IN THE PAST MONTH, HAVE YOU WISHED YOU WERE DEAD OR WISHED YOU COULD GO TO SLEEP AND NOT WAKE UP?: NO

## 2024-08-08 ASSESSMENT — PAIN SCALES - GENERAL: PAINLEVEL: 4

## 2024-08-18 DIAGNOSIS — F32.A ANXIETY AND DEPRESSION: ICD-10-CM

## 2024-08-18 DIAGNOSIS — F41.9 ANXIETY AND DEPRESSION: ICD-10-CM

## 2024-08-21 RX ORDER — SERTRALINE HYDROCHLORIDE 50 MG/1
TABLET, FILM COATED ORAL
Qty: 30 TABLET | Refills: 11 | Status: SHIPPED | OUTPATIENT
Start: 2024-08-21

## 2024-09-30 RX ORDER — PREGABALIN 50 MG/1
50 CAPSULE ORAL 3 TIMES DAILY
COMMUNITY

## 2024-09-30 RX ORDER — PREGABALIN 50 MG/1
50 CAPSULE ORAL 3 TIMES DAILY
OUTPATIENT
Start: 2024-09-30

## 2024-09-30 NOTE — TELEPHONE ENCOUNTER
Pt had appendectomy in 2020 and was given pregabilin for sweats and pain at the incision. Pt requesting a refill of it because he would get it from pain management doctor but he retired. Pt states he takes 50mg 3 times a day

## 2024-10-21 DIAGNOSIS — R03.0 ELEVATED BLOOD PRESSURE READING: ICD-10-CM

## 2024-10-21 RX ORDER — LOSARTAN POTASSIUM 25 MG/1
25 TABLET ORAL DAILY
Qty: 90 TABLET | Refills: 1 | Status: SHIPPED | OUTPATIENT
Start: 2024-10-21

## 2024-11-11 ENCOUNTER — OFFICE VISIT (OUTPATIENT)
Dept: PRIMARY CARE | Facility: CLINIC | Age: 44
End: 2024-11-11
Payer: COMMERCIAL

## 2024-11-11 VITALS
HEART RATE: 79 BPM | BODY MASS INDEX: 33.89 KG/M2 | OXYGEN SATURATION: 99 % | DIASTOLIC BLOOD PRESSURE: 78 MMHG | SYSTOLIC BLOOD PRESSURE: 116 MMHG | WEIGHT: 243 LBS

## 2024-11-11 DIAGNOSIS — F32.A ANXIETY AND DEPRESSION: Primary | ICD-10-CM

## 2024-11-11 DIAGNOSIS — F41.9 ANXIETY AND DEPRESSION: Primary | ICD-10-CM

## 2024-11-11 DIAGNOSIS — Z13.220 LIPID SCREENING: ICD-10-CM

## 2024-11-11 DIAGNOSIS — I10 ESSENTIAL HYPERTENSION: ICD-10-CM

## 2024-11-11 DIAGNOSIS — E55.9 VITAMIN D DEFICIENCY: ICD-10-CM

## 2024-11-11 DIAGNOSIS — G62.9 NEUROPATHY: ICD-10-CM

## 2024-11-11 LAB
AMPHETAMINES UR QL SCN: NORMAL
BARBITURATES UR QL SCN: NORMAL
BENZODIAZ UR QL SCN: NORMAL
BZE UR QL SCN: NORMAL
CANNABINOIDS UR QL SCN: NORMAL
FENTANYL+NORFENTANYL UR QL SCN: NORMAL
METHADONE UR QL SCN: NORMAL
OPIATES UR QL SCN: NORMAL
OXYCODONE+OXYMORPHONE UR QL SCN: NORMAL
PCP UR QL SCN: NORMAL

## 2024-11-11 PROCEDURE — 3074F SYST BP LT 130 MM HG: CPT | Performed by: PHYSICIAN ASSISTANT

## 2024-11-11 PROCEDURE — 80307 DRUG TEST PRSMV CHEM ANLYZR: CPT | Performed by: PHYSICIAN ASSISTANT

## 2024-11-11 PROCEDURE — 99214 OFFICE O/P EST MOD 30 MIN: CPT | Performed by: PHYSICIAN ASSISTANT

## 2024-11-11 PROCEDURE — 1036F TOBACCO NON-USER: CPT | Performed by: PHYSICIAN ASSISTANT

## 2024-11-11 PROCEDURE — 3078F DIAST BP <80 MM HG: CPT | Performed by: PHYSICIAN ASSISTANT

## 2024-11-11 RX ORDER — SERTRALINE HYDROCHLORIDE 100 MG/1
100 TABLET, FILM COATED ORAL DAILY
Qty: 90 TABLET | Refills: 1 | Status: SHIPPED | OUTPATIENT
Start: 2024-11-11 | End: 2025-05-10

## 2024-11-11 ASSESSMENT — ENCOUNTER SYMPTOMS
DYSPHORIC MOOD: 0
DIZZINESS: 0
ABDOMINAL PAIN: 0
HEADACHES: 0
SHORTNESS OF BREATH: 0
DIARRHEA: 0

## 2024-11-11 ASSESSMENT — PAIN SCALES - GENERAL: PAINLEVEL_OUTOF10: 0-NO PAIN

## 2024-11-11 ASSESSMENT — COLUMBIA-SUICIDE SEVERITY RATING SCALE - C-SSRS
2. HAVE YOU ACTUALLY HAD ANY THOUGHTS OF KILLING YOURSELF?: NO
1. IN THE PAST MONTH, HAVE YOU WISHED YOU WERE DEAD OR WISHED YOU COULD GO TO SLEEP AND NOT WAKE UP?: NO
6. HAVE YOU EVER DONE ANYTHING, STARTED TO DO ANYTHING, OR PREPARED TO DO ANYTHING TO END YOUR LIFE?: NO

## 2024-11-11 ASSESSMENT — PATIENT HEALTH QUESTIONNAIRE - PHQ9
2. FEELING DOWN, DEPRESSED OR HOPELESS: NOT AT ALL
SUM OF ALL RESPONSES TO PHQ9 QUESTIONS 1 AND 2: 0
1. LITTLE INTEREST OR PLEASURE IN DOING THINGS: NOT AT ALL

## 2024-11-11 NOTE — PROGRESS NOTES
Subjective   Patient ID: Manolo Laughlin is a 44 y.o. male who presents for Med Refill (Pt is here for medication refill / nr/Pt would like to discuss increasing his sertraline ) and Hemorrhoids (Pt would like pt discuss hishemorrhoids / nr).    HPI   HTN - controlled on losartan. Tolerating well. Watches sodium intake.     Anxiety - stable on 75mg zoloft, 25mg hydroxyzine (mostly for sleep). Woke up out of sleep thinking he was late to work and was experiencing heart palpitations. He did receive a job promotion and is feeling slightly overwhelmed by all the new responsibilities. He would like to increase the zoloft further.     Recall: He has been feeling overwhelmed w/intrusive recurrent thoughts. Difficulty sleeping. Reports feeling depressed due to being single. He has a hx of EtOH use disorder and it was suggested that he start latuda while he was in recovery, but he did not.     Pt reports pain in hands, feet + incision site from appendectomy. Ongoing since 2018. Saw multiple neurologists who could not figure out the issue and was told everything was normal. Saw pain management in the past who initially prescribed pregabalin. Medication dulls pain but does not completely alleviate. Took an  dose recently and is requesting refills.        Review of Systems   Respiratory:  Negative for shortness of breath.    Cardiovascular:  Negative for chest pain.   Gastrointestinal:  Negative for abdominal pain and diarrhea.   Neurological:  Negative for dizziness and headaches.   Psychiatric/Behavioral:  Negative for dysphoric mood and suicidal ideas.        Objective   /78   Pulse 79   Wt 110 kg (243 lb)   SpO2 99%   BMI 33.89 kg/m²     Physical Exam  Constitutional:       Appearance: Normal appearance.   HENT:      Head: Normocephalic and atraumatic.   Eyes:      Extraocular Movements: Extraocular movements intact.      Conjunctiva/sclera: Conjunctivae normal.   Cardiovascular:      Rate and Rhythm:  Normal rate and regular rhythm.   Pulmonary:      Effort: Pulmonary effort is normal.      Breath sounds: Normal breath sounds. No wheezing or rhonchi.   Musculoskeletal:      Cervical back: Normal range of motion and neck supple.   Skin:     General: Skin is warm and dry.   Neurological:      General: No focal deficit present.      Mental Status: He is alert.   Psychiatric:         Mood and Affect: Mood normal.         Assessment/Plan   Problem List Items Addressed This Visit    None  Visit Diagnoses         Codes    Anxiety and depression    -  Primary F41.9, F32.A    Relevant Medications    sertraline (Zoloft) 100 mg tablet    Neuropathy     G62.9    Relevant Orders    Drug Screen, Urine With Reflex to Confirmation    Essential hypertension     I10    Relevant Orders    Basic Metabolic Panel    Vitamin D deficiency     E55.9    Relevant Orders    Vitamin D 25-Hydroxy,Total (for eval of Vitamin D levels)    Lipid screening     Z13.220    Relevant Orders    Lipid Panel          Will send for pregabalin 50mg TID after receipt of urine drug screen.

## 2024-11-12 ENCOUNTER — TELEPHONE (OUTPATIENT)
Dept: PRIMARY CARE | Facility: CLINIC | Age: 44
End: 2024-11-12
Payer: COMMERCIAL

## 2024-11-12 DIAGNOSIS — G62.9 NEUROPATHY: Primary | ICD-10-CM

## 2024-11-12 RX ORDER — PREGABALIN 50 MG/1
50 CAPSULE ORAL 3 TIMES DAILY
Qty: 90 CAPSULE | Refills: 2 | Status: SHIPPED | OUTPATIENT
Start: 2024-11-12

## 2024-11-12 NOTE — TELEPHONE ENCOUNTER
Please let pt know drug screen results normal as expected. Rx for pregabalin sent to pharmacy with 2 refills. Will need FU visit in 3mos for refills.

## 2024-11-26 DIAGNOSIS — F32.A ANXIETY AND DEPRESSION: ICD-10-CM

## 2024-11-26 DIAGNOSIS — F41.9 ANXIETY AND DEPRESSION: ICD-10-CM

## 2024-11-26 RX ORDER — HYDROXYZINE HYDROCHLORIDE 25 MG/1
25 TABLET, FILM COATED ORAL NIGHTLY
Qty: 90 TABLET | Refills: 1 | Status: SHIPPED | OUTPATIENT
Start: 2024-11-26

## 2025-02-10 ENCOUNTER — APPOINTMENT (OUTPATIENT)
Dept: PRIMARY CARE | Facility: CLINIC | Age: 45
End: 2025-02-10
Payer: COMMERCIAL

## 2025-04-29 DIAGNOSIS — F41.9 ANXIETY AND DEPRESSION: ICD-10-CM

## 2025-04-29 DIAGNOSIS — F32.A ANXIETY AND DEPRESSION: ICD-10-CM

## 2025-04-29 RX ORDER — SERTRALINE HYDROCHLORIDE 100 MG/1
100 TABLET, FILM COATED ORAL DAILY
Qty: 90 TABLET | Refills: 3 | Status: SHIPPED | OUTPATIENT
Start: 2025-04-29

## 2025-05-16 ENCOUNTER — OFFICE VISIT (OUTPATIENT)
Dept: PRIMARY CARE | Facility: CLINIC | Age: 45
End: 2025-05-16
Payer: COMMERCIAL

## 2025-05-16 VITALS
DIASTOLIC BLOOD PRESSURE: 80 MMHG | BODY MASS INDEX: 32.08 KG/M2 | OXYGEN SATURATION: 98 % | HEART RATE: 89 BPM | WEIGHT: 230 LBS | SYSTOLIC BLOOD PRESSURE: 116 MMHG

## 2025-05-16 DIAGNOSIS — I10 ESSENTIAL (PRIMARY) HYPERTENSION: Primary | ICD-10-CM

## 2025-05-16 DIAGNOSIS — F41.1 GAD (GENERALIZED ANXIETY DISORDER): ICD-10-CM

## 2025-05-16 DIAGNOSIS — E55.9 VITAMIN D DEFICIENCY: ICD-10-CM

## 2025-05-16 DIAGNOSIS — G62.9 NEUROPATHY: ICD-10-CM

## 2025-05-16 DIAGNOSIS — Z13.220 LIPID SCREENING: ICD-10-CM

## 2025-05-16 PROBLEM — S86.819A STRAIN OF CALF MUSCLE: Status: RESOLVED | Noted: 2023-12-14 | Resolved: 2025-05-16

## 2025-05-16 PROBLEM — S86.112A GASTROCNEMIUS TEAR, LEFT, INITIAL ENCOUNTER: Status: RESOLVED | Noted: 2023-12-14 | Resolved: 2025-05-16

## 2025-05-16 PROBLEM — K64.9 HEMORRHOIDS: Status: ACTIVE | Noted: 2025-05-16

## 2025-05-16 PROBLEM — Z98.890 HISTORY OF HEMORRHOIDECTOMY: Status: ACTIVE | Noted: 2025-05-16

## 2025-05-16 PROBLEM — E66.811 OBESITY (BMI 30.0-34.9): Status: ACTIVE | Noted: 2022-12-09

## 2025-05-16 PROBLEM — K62.82: Status: ACTIVE | Noted: 2025-05-16

## 2025-05-16 PROBLEM — A63.0 HPV (HUMAN PAPILLOMA VIRUS) ANOGENITAL INFECTION: Status: ACTIVE | Noted: 2025-05-16

## 2025-05-16 PROBLEM — Z86.19 HISTORY OF CLOSTRIDIOIDES DIFFICILE COLITIS: Status: ACTIVE | Noted: 2025-05-16

## 2025-05-16 PROBLEM — K60.2 ANAL FISSURE: Status: ACTIVE | Noted: 2022-12-09

## 2025-05-16 PROBLEM — K62.5 BRIGHT RED BLOOD PER RECTUM: Status: RESOLVED | Noted: 2024-08-08 | Resolved: 2025-05-16

## 2025-05-16 PROBLEM — K64.8 BLEEDING INTERNAL HEMORRHOIDS: Status: ACTIVE | Noted: 2025-05-16

## 2025-05-16 PROBLEM — R03.0 ELEVATED BLOOD PRESSURE READING: Status: RESOLVED | Noted: 2024-04-15 | Resolved: 2025-05-16

## 2025-05-16 PROBLEM — L29.0 PRURITUS ANI: Status: RESOLVED | Noted: 2024-08-08 | Resolved: 2025-05-16

## 2025-05-16 PROCEDURE — 1036F TOBACCO NON-USER: CPT | Performed by: PHYSICIAN ASSISTANT

## 2025-05-16 PROCEDURE — 99214 OFFICE O/P EST MOD 30 MIN: CPT | Performed by: PHYSICIAN ASSISTANT

## 2025-05-16 PROCEDURE — 3074F SYST BP LT 130 MM HG: CPT | Performed by: PHYSICIAN ASSISTANT

## 2025-05-16 PROCEDURE — 3079F DIAST BP 80-89 MM HG: CPT | Performed by: PHYSICIAN ASSISTANT

## 2025-05-16 RX ORDER — PREGABALIN 50 MG/1
50 CAPSULE ORAL 2 TIMES DAILY
Qty: 180 CAPSULE | Refills: 1 | Status: SHIPPED | OUTPATIENT
Start: 2025-05-16

## 2025-05-16 RX ORDER — BUSPIRONE HYDROCHLORIDE 5 MG/1
5 TABLET ORAL DAILY
Qty: 30 TABLET | Refills: 0 | Status: SHIPPED | OUTPATIENT
Start: 2025-05-16 | End: 2025-06-15

## 2025-05-16 RX ORDER — MAGNESIUM GLUCONATE 27 MG(500)
27 TABLET ORAL 2 TIMES DAILY
COMMUNITY

## 2025-05-16 ASSESSMENT — ENCOUNTER SYMPTOMS
DYSPHORIC MOOD: 0
LOSS OF SENSATION IN FEET: 0
SHORTNESS OF BREATH: 0
OCCASIONAL FEELINGS OF UNSTEADINESS: 0
DIZZINESS: 0
HEADACHES: 0
LIGHT-HEADEDNESS: 0
DEPRESSION: 0
NERVOUS/ANXIOUS: 1

## 2025-05-16 ASSESSMENT — PAIN SCALES - GENERAL: PAINLEVEL_OUTOF10: 0-NO PAIN

## 2025-05-16 NOTE — PROGRESS NOTES
Subjective   Patient ID: Manolo Laughlin is a 45 y.o. male who presents for Med Refill (Pt is here medication follow up / nr) and Fall (Pt fell in his house, about 3 weeks ago / nr).    HPI   HTN - controlled on 25mg losartan    Anxiety - stable on 100mg zoloft, 25mg hydroxyzine (mostly for sleep). Depression has improved but anxiety is worse. Triggered mostly by work stress. Panic/anxiety sxs occurring daily. Not interested in counseling. He was previously doing court-ordered therapy but did not find any benefit.     Fell 2 weeks ago, tripped over a heater. Fell backwards, did not hit his head. Had a lot of rectal bleeding after the fall but has improved significantly over the past week.     Pt reports pain in hands, feet + incision site from appendectomy. Ongoing since 2018. Saw multiple neurologists who could not figure out the issue and was told everything was normal. Saw pain management in the past who initially prescribed pregabalin. Medication dulls pain but does not completely alleviate. Feeling stable on 50mg lyrica BID.    CSC 11/11/24   UDS 11/11/24   [facility] reviewed, last filled 3/28/25    Review of Systems   Eyes:  Negative for visual disturbance.   Respiratory:  Negative for shortness of breath.    Neurological:  Negative for dizziness, light-headedness and headaches.   Psychiatric/Behavioral:  Negative for dysphoric mood. The patient is nervous/anxious.        Objective   /80   Pulse 89   Wt 104 kg (230 lb)   SpO2 98%   BMI 32.08 kg/m²     Physical Exam  Constitutional:       Appearance: Normal appearance.   HENT:      Head: Normocephalic and atraumatic.   Eyes:      Extraocular Movements: Extraocular movements intact.      Conjunctiva/sclera: Conjunctivae normal.   Cardiovascular:      Rate and Rhythm: Normal rate and regular rhythm.   Pulmonary:      Effort: Pulmonary effort is normal.      Breath sounds: Normal breath sounds. No wheezing or rhonchi.   Musculoskeletal:      Cervical  back: Normal range of motion and neck supple.   Skin:     General: Skin is warm and dry.   Neurological:      General: No focal deficit present.      Mental Status: He is alert.   Psychiatric:         Mood and Affect: Mood normal.         Assessment/Plan   Problem List Items Addressed This Visit           ICD-10-CM    Essential (primary) hypertension - Primary I10    Relevant Orders    Albumin-Creatinine Ratio, Urine Random    Comprehensive Metabolic Panel    JAN (generalized anxiety disorder) F41.1    Relevant Medications    busPIRone (Buspar) 5 mg tablet     Other Visit Diagnoses         Codes      Vitamin D deficiency     E55.9    Relevant Orders    Vitamin D 25-Hydroxy,Total (for eval of Vitamin D levels)      Lipid screening     Z13.220    Relevant Orders    Lipid Panel      Neuropathy     G62.9    Relevant Medications    pregabalin (Lyrica) 50 mg capsule          FU 1mo for dose adjustment but recommend giving an update in 2 week to increase sooner if needed    Lyrica refilled. CSC + UDS up to date. Will FU November for CPE for repeat urine drug screen + BP FU.    Pt has a longstanding hx of rectal bleeding with hemorrhoids and anal fissures. He has seen multiple specialists/received multiple treatments. Currently feeling stable w/minimal bleeding, not interested in FU w/specialist at this time as he does not wish to mess with anything. He does work a physically active job at times and occasionally will have bleeding w/excessive physical activity. His job will not allow for me to write work accommodations however if he does have an episode of acute bleeding that limits his ability to perform essential job functions, he can apply for STD. Advised that while I'm out on [place] he would need to schedule an appt w/another provider to have this ppwk completed should he need it.

## 2025-06-04 ENCOUNTER — TELEPHONE (OUTPATIENT)
Dept: PRIMARY CARE | Facility: CLINIC | Age: 45
End: 2025-06-04
Payer: COMMERCIAL

## 2025-06-04 DIAGNOSIS — E78.5 DYSLIPIDEMIA: Primary | ICD-10-CM

## 2025-06-04 LAB
25(OH)D3+25(OH)D2 SERPL-MCNC: 26 NG/ML (ref 30–100)
ALBUMIN SERPL-MCNC: 4.5 G/DL (ref 3.6–5.1)
ALBUMIN/CREAT UR: 3 MG/G CREAT
ALP SERPL-CCNC: 45 U/L (ref 36–130)
ALT SERPL-CCNC: 15 U/L (ref 9–46)
ANION GAP SERPL CALCULATED.4IONS-SCNC: 8 MMOL/L (CALC) (ref 7–17)
AST SERPL-CCNC: 19 U/L (ref 10–40)
BILIRUB SERPL-MCNC: 0.7 MG/DL (ref 0.2–1.2)
BUN SERPL-MCNC: 17 MG/DL (ref 7–25)
CALCIUM SERPL-MCNC: 9.1 MG/DL (ref 8.6–10.3)
CHLORIDE SERPL-SCNC: 106 MMOL/L (ref 98–110)
CHOLEST SERPL-MCNC: 263 MG/DL
CHOLEST/HDLC SERPL: 6.9 (CALC)
CO2 SERPL-SCNC: 29 MMOL/L (ref 20–32)
CREAT SERPL-MCNC: 1.04 MG/DL (ref 0.6–1.29)
CREAT UR-MCNC: 192 MG/DL (ref 20–320)
EGFRCR SERPLBLD CKD-EPI 2021: 90 ML/MIN/1.73M2
GLUCOSE SERPL-MCNC: 97 MG/DL (ref 65–99)
HDLC SERPL-MCNC: 38 MG/DL
LDLC SERPL CALC-MCNC: 172 MG/DL (CALC)
MICROALBUMIN UR-MCNC: 0.5 MG/DL
NONHDLC SERPL-MCNC: 225 MG/DL (CALC)
POTASSIUM SERPL-SCNC: 3.8 MMOL/L (ref 3.5–5.3)
PROT SERPL-MCNC: 6.6 G/DL (ref 6.1–8.1)
SODIUM SERPL-SCNC: 143 MMOL/L (ref 135–146)
TRIGL SERPL-MCNC: 312 MG/DL

## 2025-06-05 NOTE — TELEPHONE ENCOUNTER
Cholesterol significantly abnormal. Needs CT Ca score, order placed. Recommend mediterranean diet + routine exercise.     Vitamin D is low - recommend 1000IU vitamin D3 daily    Rest of labs normal.

## 2025-06-05 NOTE — TELEPHONE ENCOUNTER
Left voice message for pt to call back office in regards to lab results. Also, sent [employer] message to pt.

## 2025-06-11 DIAGNOSIS — F41.1 GAD (GENERALIZED ANXIETY DISORDER): ICD-10-CM

## 2025-06-11 NOTE — TELEPHONE ENCOUNTER
Pt called to let pcp know that the busPIRone (Buspar) 5 mg tablet  is going well and he is feeling a little bit better.     Pt also asking for a refill on it and for it to go to [facility] pharmacy in [place], updated in chart

## 2025-06-12 RX ORDER — BUSPIRONE HYDROCHLORIDE 5 MG/1
5 TABLET ORAL DAILY
Qty: 30 TABLET | Refills: 0 | Status: SHIPPED | OUTPATIENT
Start: 2025-06-12 | End: 2025-07-12

## 2025-06-19 DIAGNOSIS — R03.0 ELEVATED BLOOD PRESSURE READING: ICD-10-CM

## 2025-06-19 RX ORDER — LOSARTAN POTASSIUM 25 MG/1
25 TABLET ORAL DAILY
Qty: 90 TABLET | Refills: 1 | Status: SHIPPED | OUTPATIENT
Start: 2025-06-19

## 2025-06-25 ASSESSMENT — ENCOUNTER SYMPTOMS
DIZZINESS: 0
LIGHT-HEADEDNESS: 0
HEADACHES: 0
SHORTNESS OF BREATH: 0
BLOOD IN STOOL: 0
ABDOMINAL PAIN: 0

## 2025-06-26 ENCOUNTER — OFFICE VISIT (OUTPATIENT)
Dept: PRIMARY CARE | Facility: CLINIC | Age: 45
End: 2025-06-26
Payer: COMMERCIAL

## 2025-06-26 VITALS
OXYGEN SATURATION: 98 % | BODY MASS INDEX: 32.22 KG/M2 | WEIGHT: 231 LBS | DIASTOLIC BLOOD PRESSURE: 70 MMHG | HEART RATE: 73 BPM | SYSTOLIC BLOOD PRESSURE: 134 MMHG

## 2025-06-26 DIAGNOSIS — F41.1 GAD (GENERALIZED ANXIETY DISORDER): ICD-10-CM

## 2025-06-26 DIAGNOSIS — E78.5 DYSLIPIDEMIA: Primary | ICD-10-CM

## 2025-06-26 DIAGNOSIS — E55.9 VITAMIN D DEFICIENCY: ICD-10-CM

## 2025-06-26 DIAGNOSIS — I10 ESSENTIAL (PRIMARY) HYPERTENSION: ICD-10-CM

## 2025-06-26 PROCEDURE — 1036F TOBACCO NON-USER: CPT | Performed by: PHYSICIAN ASSISTANT

## 2025-06-26 PROCEDURE — 99214 OFFICE O/P EST MOD 30 MIN: CPT | Performed by: PHYSICIAN ASSISTANT

## 2025-06-26 PROCEDURE — 3075F SYST BP GE 130 - 139MM HG: CPT | Performed by: PHYSICIAN ASSISTANT

## 2025-06-26 PROCEDURE — 3078F DIAST BP <80 MM HG: CPT | Performed by: PHYSICIAN ASSISTANT

## 2025-06-26 RX ORDER — CHOLECALCIFEROL (VITAMIN D3) 25 MCG
25 TABLET ORAL DAILY
COMMUNITY

## 2025-06-26 RX ORDER — BUSPIRONE HYDROCHLORIDE 10 MG/1
10 TABLET ORAL 2 TIMES DAILY
Qty: 60 TABLET | Refills: 5 | Status: SHIPPED | OUTPATIENT
Start: 2025-06-26 | End: 2026-06-26

## 2025-06-26 ASSESSMENT — PAIN SCALES - GENERAL: PAINLEVEL_OUTOF10: 0-NO PAIN

## 2025-06-26 NOTE — PROGRESS NOTES
Subjective   Patient ID: Manolo Laughlin is a 45 y.o. male who presents for Med Refill (Pt is here for med follow up / nr).    HPI   HTN - controlled on 25mg losartan    Anxiety - last visit w/worsening anxiety despite 100mg zoloft, 25mg hydroxyzine (mostly for sleep). Triggered mostly by work stress. Panic/anxiety sxs occurring daily. Started on buspar in addition to current medications. Here to FU. Tolerating medication okay but thinks he needs to increase the dose. States he has been calmer and making better decisions since starting.  Depression has improved. Not interested in counseling - has done court-ordered therapy in the past w/no benefit.     Vit D deficiency - encouraged to start 1000IU vitamin D3 daily,     Dyslipidemia - CT Ca score ordered. No FamHx HLD, nonsmoker. No paretns or siblings with MI. No CP, dyspnea on exertion.      Review of Systems   HENT:  Negative for hearing loss.    Eyes:  Negative for visual disturbance.   Respiratory:  Negative for shortness of breath.    Cardiovascular:  Negative for chest pain.   Gastrointestinal:  Negative for abdominal pain and blood in stool.   Neurological:  Negative for dizziness, light-headedness and headaches.       Objective   /70   Pulse 73   Wt 105 kg (231 lb)   SpO2 98%   BMI 32.22 kg/m²     Physical Exam  Constitutional:       Appearance: Normal appearance.   HENT:      Head: Normocephalic and atraumatic.   Eyes:      Extraocular Movements: Extraocular movements intact.      Conjunctiva/sclera: Conjunctivae normal.   Cardiovascular:      Rate and Rhythm: Normal rate and regular rhythm.   Pulmonary:      Effort: Pulmonary effort is normal.      Breath sounds: Normal breath sounds. No wheezing or rhonchi.   Musculoskeletal:      Cervical back: Normal range of motion and neck supple.   Skin:     General: Skin is warm and dry.   Neurological:      General: No focal deficit present.      Mental Status: He is alert.   Psychiatric:         Mood  and Affect: Mood normal.         Assessment/Plan   Problem List Items Addressed This Visit           ICD-10-CM    Essential (primary) hypertension I10    Relevant Orders    Basic Metabolic Panel    JAN (generalized anxiety disorder) F41.1    Relevant Medications    busPIRone (Buspar) 10 mg tablet     Other Visit Diagnoses         Codes      Dyslipidemia    -  Primary E78.5    Relevant Orders    Lipid Panel      Vitamin D deficiency     E55.9    Relevant Orders    Vitamin D 25-Hydroxy,Total (for eval of Vitamin D levels)          Will increase buspar to 10mg once daily but send enough that he can self-titrate to BID if needed while I'm out    Dyslipidemia - discussed Mediterranean lifestyle, increased exercise. Has CT Ca score ordered for August. Will repeat testing in December after lifestyle modifications.

## 2025-08-05 ENCOUNTER — HOSPITAL ENCOUNTER (OUTPATIENT)
Dept: RADIOLOGY | Facility: CLINIC | Age: 45
Discharge: HOME | End: 2025-08-05
Payer: COMMERCIAL

## 2025-08-05 DIAGNOSIS — E78.5 DYSLIPIDEMIA: ICD-10-CM

## 2025-08-05 PROCEDURE — 75571 CT HRT W/O DYE W/CA TEST: CPT

## 2025-08-06 ENCOUNTER — RESULTS FOLLOW-UP (OUTPATIENT)
Dept: PRIMARY CARE | Facility: CLINIC | Age: 45
End: 2025-08-06
Payer: COMMERCIAL